# Patient Record
Sex: FEMALE | Race: WHITE
[De-identification: names, ages, dates, MRNs, and addresses within clinical notes are randomized per-mention and may not be internally consistent; named-entity substitution may affect disease eponyms.]

---

## 2018-02-19 ENCOUNTER — HOSPITAL ENCOUNTER (OUTPATIENT)
Dept: HOSPITAL 45 - C.LABMFLN | Age: 53
Discharge: HOME | End: 2018-02-19
Attending: FAMILY MEDICINE
Payer: COMMERCIAL

## 2018-02-19 DIAGNOSIS — M79.629: Primary | ICD-10-CM

## 2018-02-19 DIAGNOSIS — E06.3: ICD-10-CM

## 2018-02-19 LAB
BASOPHILS # BLD: 0.03 K/UL (ref 0–0.2)
BASOPHILS NFR BLD: 0.4 %
EOS ABS #: 0.17 K/UL (ref 0–0.5)
EOSINOPHIL NFR BLD AUTO: 215 K/UL (ref 130–400)
HCT VFR BLD CALC: 38 % (ref 37–47)
HGB BLD-MCNC: 12.8 G/DL (ref 12–16)
IG#: 0.02 K/UL (ref 0–0.02)
IMM GRANULOCYTES NFR BLD AUTO: 24 %
LYMPHOCYTES # BLD: 2 K/UL (ref 1.2–3.4)
MCH RBC QN AUTO: 29 PG (ref 25–34)
MCHC RBC AUTO-ENTMCNC: 33.7 G/DL (ref 32–36)
MCV RBC AUTO: 86 FL (ref 80–100)
MONO ABS #: 0.71 K/UL (ref 0.11–0.59)
MONOCYTES NFR BLD: 8.5 %
NEUT ABS #: 5.39 K/UL (ref 1.4–6.5)
NEUTROPHILS # BLD AUTO: 2 %
NEUTROPHILS NFR BLD AUTO: 64.9 %
PMV BLD AUTO: 10.4 FL (ref 7.4–10.4)
RED CELL DISTRIBUTION WIDTH CV: 14.3 % (ref 11.5–14.5)
RED CELL DISTRIBUTION WIDTH SD: 44.9 FL (ref 36.4–46.3)
WBC # BLD AUTO: 8.32 K/UL (ref 4.8–10.8)

## 2018-02-28 ENCOUNTER — HOSPITAL ENCOUNTER (OUTPATIENT)
Dept: HOSPITAL 45 - C.LABMFLN | Age: 53
Discharge: HOME | End: 2018-02-28
Attending: FAMILY MEDICINE
Payer: COMMERCIAL

## 2018-02-28 DIAGNOSIS — R53.83: ICD-10-CM

## 2018-02-28 DIAGNOSIS — R82.90: Primary | ICD-10-CM

## 2018-02-28 LAB
BASOPHILS # BLD: 0.02 K/UL (ref 0–0.2)
BASOPHILS NFR BLD: 0.3 %
BUN SERPL-MCNC: 10 MG/DL (ref 7–18)
CALCIUM SERPL-MCNC: 8.8 MG/DL (ref 8.5–10.1)
CO2 SERPL-SCNC: 26 MMOL/L (ref 21–32)
CREAT SERPL-MCNC: 0.72 MG/DL (ref 0.6–1.2)
EOS ABS #: 0.14 K/UL (ref 0–0.5)
EOSINOPHIL NFR BLD AUTO: 174 K/UL (ref 130–400)
FLUAV RNA SPEC QL NAA+PROBE: (no result)
FLUBV RNA SPEC QL NAA+PROBE: (no result)
GLUCOSE SERPL-MCNC: 81 MG/DL (ref 70–99)
HCT VFR BLD CALC: 39.1 % (ref 37–47)
HGB BLD-MCNC: 13.3 G/DL (ref 12–16)
IG#: 0.01 K/UL (ref 0–0.02)
IMM GRANULOCYTES NFR BLD AUTO: 21.2 %
LYMPHOCYTES # BLD: 1.49 K/UL (ref 1.2–3.4)
MCH RBC QN AUTO: 28.5 PG (ref 25–34)
MCHC RBC AUTO-ENTMCNC: 34 G/DL (ref 32–36)
MCV RBC AUTO: 83.7 FL (ref 80–100)
MONO ABS #: 0.56 K/UL (ref 0.11–0.59)
MONOCYTES NFR BLD: 8 %
NEUT ABS #: 4.8 K/UL (ref 1.4–6.5)
NEUTROPHILS # BLD AUTO: 2 %
NEUTROPHILS NFR BLD AUTO: 68.4 %
PMV BLD AUTO: 10.5 FL (ref 7.4–10.4)
POTASSIUM SERPL-SCNC: 3.9 MMOL/L (ref 3.5–5.1)
RED CELL DISTRIBUTION WIDTH CV: 14.4 % (ref 11.5–14.5)
RED CELL DISTRIBUTION WIDTH SD: 44.4 FL (ref 36.4–46.3)
SODIUM SERPL-SCNC: 138 MMOL/L (ref 136–145)
WBC # BLD AUTO: 7.02 K/UL (ref 4.8–10.8)

## 2018-03-08 ENCOUNTER — HOSPITAL ENCOUNTER (OUTPATIENT)
Dept: HOSPITAL 45 - C.MRI | Age: 53
Discharge: HOME | End: 2018-03-08
Attending: FAMILY MEDICINE
Payer: COMMERCIAL

## 2018-03-08 DIAGNOSIS — R53.1: Primary | ICD-10-CM

## 2018-03-08 NOTE — DIAGNOSTIC IMAGING REPORT
MRI OF THE BRAIN COMBO



CLINICAL HISTORY: Right-sided weakness.



COMPARISON STUDY: No priors.



TECHNIQUE: MRI of the brain was performed utilizing various T1 and T2-weighted

sequences in the axial, sagittal, and coronal planes. Contrast-enhanced

sequences were acquired following the administration of 8.5 cc of Gadavist.



FINDINGS:



Brain parenchyma: The brain parenchyma is normal in appearance. There is no

hemorrhage or mass effect. There is no restricted diffusion to suggest acute

ischemia. No enhancing mass lesion is identified on the postcontrast images.

Gray-white matter differentiation is preserved. No extra-axial fluid collection

is seen. The cerebellar tonsils are normal in configuration.



Ventricles, sulci, and cisterns: Normal in configuration.



Pituitary and sella: Unremarkable.



Intracranial vasculature: Normal flow voids are maintained at the skull base.



Orbits: The bony orbits are grossly intact. Orbital contents are normal in

appearance.



Sinuses and mastoids: Clear.



Calvarium: Unremarkable.



Cervical cord: Partially visualized cervical spinal cord is normal in morphology

and signal intensity.





IMPRESSION: No acute intracranial abnormality.







Electronically signed by:  Kolton Hinojosa M.D.

3/8/2018 2:27 PM



Dictated Date/Time:  3/8/2018 2:24 PM

## 2018-03-13 ENCOUNTER — HOSPITAL ENCOUNTER (OUTPATIENT)
Dept: HOSPITAL 45 - C.LABMFLN | Age: 53
End: 2018-03-13
Attending: FAMILY MEDICINE
Payer: COMMERCIAL

## 2018-03-13 DIAGNOSIS — M79.629: Primary | ICD-10-CM
